# Patient Record
Sex: MALE | Race: WHITE | NOT HISPANIC OR LATINO | Employment: FULL TIME | URBAN - METROPOLITAN AREA
[De-identification: names, ages, dates, MRNs, and addresses within clinical notes are randomized per-mention and may not be internally consistent; named-entity substitution may affect disease eponyms.]

---

## 2020-10-10 ENCOUNTER — APPOINTMENT (OUTPATIENT)
Dept: ULTRASOUND IMAGING | Facility: CLINIC | Age: 35
End: 2020-10-10
Attending: EMERGENCY MEDICINE
Payer: OTHER GOVERNMENT

## 2020-10-10 ENCOUNTER — HOSPITAL ENCOUNTER (EMERGENCY)
Facility: CLINIC | Age: 35
Discharge: HOME | End: 2020-10-11
Attending: EMERGENCY MEDICINE | Admitting: EMERGENCY MEDICINE
Payer: OTHER GOVERNMENT

## 2020-10-10 DIAGNOSIS — R60.9 PITTING EDEMA: ICD-10-CM

## 2020-10-10 DIAGNOSIS — M71.22 POPLITEAL CYST, LEFT: ICD-10-CM

## 2020-10-10 PROBLEM — H93.19 TINNITUS: Status: ACTIVE | Noted: 2020-10-10

## 2020-10-10 PROBLEM — M25.569 KNEE PAIN: Status: ACTIVE | Noted: 2020-10-10

## 2020-10-10 PROBLEM — F17.200 TOBACCO DEPENDENCE: Status: ACTIVE | Noted: 2020-10-10

## 2020-10-10 PROBLEM — M54.50 LOW BACK PAIN: Status: ACTIVE | Noted: 2020-10-10

## 2020-10-10 PROCEDURE — 99284 EMERGENCY DEPT VISIT MOD MDM: CPT | Mod: 25 | Performed by: EMERGENCY MEDICINE

## 2020-10-10 PROCEDURE — 93971 EXTREMITY STUDY: CPT | Mod: LT

## 2020-10-10 PROCEDURE — 99284 EMERGENCY DEPT VISIT MOD MDM: CPT | Performed by: EMERGENCY MEDICINE

## 2020-10-10 ASSESSMENT — ENCOUNTER SYMPTOMS
WEAKNESS: 0
HEADACHES: 0
FATIGUE: 0
BACK PAIN: 0
VOMITING: 0
CHEST TIGHTNESS: 0
NAUSEA: 0
CHILLS: 0
FLANK PAIN: 0
NUMBNESS: 0
ABDOMINAL PAIN: 0
DIARRHEA: 0
COUGH: 0
APPETITE CHANGE: 0
FEVER: 0
LIGHT-HEADEDNESS: 0
WOUND: 0
SHORTNESS OF BREATH: 0

## 2020-10-10 ASSESSMENT — MIFFLIN-ST. JEOR: SCORE: 2253.53

## 2020-10-10 NOTE — ED AVS SNAPSHOT
[facility] Emergency Dept  [address] [address]  Phone: [phone]  Fax: [phone]                                    Tono Hannon   MRN: [id number]    Department: [facility] Emergency Dept   Date of Visit: 10/10/2020           After Visit Summary Signature Page    I have received my discharge instructions, and my questions have been answered. I have discussed any challenges I see with this plan with the nurse or doctor.    ..........................................................................................................................................  Patient/Patient Representative Signature      ..........................................................................................................................................  Patient Representative Print Name and Relationship to Patient    ..................................................               ................................................  Date                                   Time    ..........................................................................................................................................  Reviewed by Signature/Title    ...................................................              ..............................................  Date                                               Time          22EPIC Rev 08/18

## 2020-10-11 VITALS
OXYGEN SATURATION: 98 % | BODY MASS INDEX: 32.27 KG/M2 | WEIGHT: 265 LBS | HEIGHT: 76 IN | HEART RATE: 96 BPM | TEMPERATURE: 98.8 F | SYSTOLIC BLOOD PRESSURE: 137 MMHG | DIASTOLIC BLOOD PRESSURE: 89 MMHG | RESPIRATION RATE: 16 BRPM

## 2020-10-11 NOTE — ED PROVIDER NOTES
History     Chief Complaint   Patient presents with     Leg Pain     Pt c/o left calf pain and swelling - also noted 'bruising' to inner, upper thigh/groin area this evening and doesn't remember any injury - denies any sob or chest pain     HPI  Tono Hannon is a 34 year old male with no significant contributing past medical history presenting for evaluation of left-sided leg pain and swelling.  Patient reports about a week and a half of some achy pain in his left knee.  Patient has a history of knee pain so did not think too much about it but pain has somewhat worsened over the past several days.  Since yesterday patient started develop some mild swelling and stay swelling dramatically worsened.  Patient was on his feet for a prolonged time today.  No pain or swelling in his right leg.  Patient now reporting swelling throughout his leg with some pain in his calf, behind his left knee, and extending into his posterior left leg.  Also noticed a new bruise on the inside of his left leg with no known injury.  Denies pain in the site.  Denies any history of blood clots.  Denies any recent long road trips or flights.  Denies any recent immobility or surgery.  No family history of blood clots.  Denies any chest pain or difficulty breathing.  Otherwise reports systemically feeling well.    Allergies:  No Known Allergies    Problem List:    Patient Active Problem List    Diagnosis Date Noted     Knee pain 10/10/2020     Priority: Medium     Low back pain 10/10/2020     Priority: Medium     Tinnitus 10/10/2020     Priority: Medium     Tobacco dependence 10/10/2020     Priority: Medium     CARDIOVASCULAR SCREENING; LDL GOAL LESS THAN 160 10/31/2010     Priority: Medium        Past Medical History:    Past Medical History:   Diagnosis Date     Chicken Pox      Collar Bone Fracture      Major Depressive Disorder, Single Episode in Full Remission (H)      Penile Papules      Tobacco Abuse        Past Surgical History:    Past  "Surgical History:   Procedure Laterality Date     NO HISTORY OF SURGERY         Family History:    Family History   Problem Relation Age of Onset     Asthma No family hx of      C.A.D. No family hx of      Diabetes No family hx of      Hypertension No family hx of      Cerebrovascular Disease No family hx of      Breast Cancer No family hx of      Cancer - colorectal No family hx of      Prostate Cancer No family hx of        Social History:  Marital Status:   [2]  Social History     Tobacco Use     Smoking status: Not on file     Smokeless tobacco: Current User     Types: Chew   Substance Use Topics     Alcohol use: Yes     Drug use: No        Medications:    No current outpatient medications on file.        Review of Systems   Constitutional: Negative for appetite change, chills, fatigue and fever.   HENT: Negative for congestion.    Respiratory: Negative for cough, chest tightness and shortness of breath.    Cardiovascular: Positive for leg swelling (Left leg only). Negative for chest pain.   Gastrointestinal: Negative for abdominal pain, diarrhea, nausea and vomiting.   Genitourinary: Negative for decreased urine volume and flank pain.   Musculoskeletal: Negative for back pain.   Skin: Negative for rash and wound.   Neurological: Negative for weakness, light-headedness, numbness and headaches.   All other systems reviewed and are negative.      Physical Exam   BP: 137/89  Pulse: 96  Temp: 98.8  F (37.1  C)  Resp: 16  Height: 193 cm (6' 4\")  Weight: 120.2 kg (265 lb)  SpO2: 98 %      Physical Exam  Vitals signs and nursing note reviewed.   Constitutional:       Appearance: He is obese. He is not ill-appearing or diaphoretic.   HENT:      Head: Atraumatic.      Nose: Nose normal.   Eyes:      Conjunctiva/sclera: Conjunctivae normal.   Cardiovascular:      Rate and Rhythm: Normal rate and regular rhythm.      Pulses: Normal pulses.   Pulmonary:      Effort: Pulmonary effort is normal.   Musculoskeletal:      " Right lower leg: No edema.      Left lower leg: He exhibits tenderness (+calf tenderness). He exhibits no deformity. Edema (1-2+ Pitting edema) present.   Skin:     General: Skin is warm.      Capillary Refill: Capillary refill takes less than 2 seconds.   Neurological:      Mental Status: He is alert and oriented to person, place, and time.   Psychiatric:         Mood and Affect: Mood normal.         ED Course        Procedures                   Results for orders placed or performed during the hospital encounter of 10/10/20 (from the past 24 hour(s))   US Lower Extremity Venous Duplex Left    Narrative    EXAM: US LOWER EXTREMITY VENOUS DUPLEX LEFT  LOCATION: [facility]  DATE/TIME: 10/10/2020 11:05 PM    INDICATION: Left leg pain and swelling  COMPARISON: None.  TECHNIQUE: Venous Duplex ultrasound of the left lower extremity with and without compression, augmentation and duplex. Color flow and spectral Doppler with waveform analysis performed.    FINDINGS: Exam includes the common femoral, femoral, popliteal, and contralateral common femoral veins as well as segmentally visualized deep calf veins and greater saphenous vein.     LEFT: No deep vein thrombosis. No superficial thrombophlebitis. Large 12.7 x 1.0 x 2.6 cm popliteal cyst.      Impression    IMPRESSION:  1.  No deep venous thrombosis in the left lower extremity.       Medications - No data to display     12:12 AM Patient re-assessed: Advised of ultrasound findings.  No evidence of DVT but a large popliteal cyst is present.  Recommended compression socks for his leg edema.  Ace wrapped here.  Given the progressive pain and swelling, recommend orthopedic follow-up for possible intervention to his large popliteal cyst.  Advised to keep his legs up when possible and wear compression socks during the day when he is ambulatory.      Assessments & Plan (with Medical Decision Making)  32-year-old male presenting for evaluation of progressive pain  and swelling of his left lower leg.  Pain began around the back of his knee and has progressed to involve his posterior knee as well as lower leg with associated swelling of the lower leg.  On exam he has some pitting edema and notable swelling of the left lower leg.  No right leg symptoms.  No history of DVT.  Ultrasound negative for DVT.  Ultrasound did show a large popliteal cyst which is likely causing some compression leading to the associated edema.  Recommended compression treatment and follow-up with orthopedics given the presence of large possibly enlarging cyst which may need surgical intervention.     I have reviewed the nursing notes.    I have reviewed the findings, diagnosis, plan and need for follow up with the patient.       There are no discharge medications for this patient.      Final diagnoses:   Popliteal cyst, left   Pitting edema       10/10/2020   [facility] EMERGENCY DEPT     Sarabia, Neptali Banegas MD  10/11/20 0037

## 2022-10-06 ENCOUNTER — HOSPITAL ENCOUNTER (EMERGENCY)
Facility: CLINIC | Age: 37
Discharge: HOME | End: 2022-10-06
Attending: NURSE PRACTITIONER | Admitting: NURSE PRACTITIONER
Payer: OTHER GOVERNMENT

## 2022-10-06 VITALS
SYSTOLIC BLOOD PRESSURE: 124 MMHG | RESPIRATION RATE: 22 BRPM | WEIGHT: 275 LBS | BODY MASS INDEX: 33.49 KG/M2 | DIASTOLIC BLOOD PRESSURE: 59 MMHG | OXYGEN SATURATION: 95 % | HEIGHT: 76 IN | HEART RATE: 90 BPM | TEMPERATURE: 98.1 F

## 2022-10-06 DIAGNOSIS — R05.8 POST-VIRAL COUGH SYNDROME: ICD-10-CM

## 2022-10-06 PROCEDURE — 99213 OFFICE O/P EST LOW 20 MIN: CPT | Performed by: NURSE PRACTITIONER

## 2022-10-06 PROCEDURE — G0463 HOSPITAL OUTPT CLINIC VISIT: HCPCS | Performed by: NURSE PRACTITIONER

## 2022-10-06 RX ORDER — DEXAMETHASONE 2 MG/1
10 TABLET ORAL ONCE
Qty: 5 TABLET | Refills: 0 | Status: SHIPPED | OUTPATIENT
Start: 2022-10-06 | End: 2022-10-06

## 2022-10-06 ASSESSMENT — ACTIVITIES OF DAILY LIVING (ADL): ADLS_ACUITY_SCORE: 35

## 2022-10-06 NOTE — ED PROVIDER NOTES
History     Chief Complaint   Patient presents with     Cough     HPI  Tono Hannon is a 36 year old male who presents with concerns of 6 day hx of Ear pressure, Cough, Nasal congestion, Decreased energy, weakness improving, Post tussive emesis times two yesterday,    Patient has been using over-the-counter NyQuil and states this is mildly helpful    5 year old Son was ill and better now    Pt has tested for covid at home negative, covid vaccinated    Pt denies recreational drug use, non smoker, non vaping, non chewing tobacco, no hx of asthma,    Allergies:  No Known Allergies    Problem List:    Patient Active Problem List    Diagnosis Date Noted     Knee pain 10/10/2020     Priority: Medium     Low back pain 10/10/2020     Priority: Medium     Tinnitus 10/10/2020     Priority: Medium     Tobacco dependence 10/10/2020     Priority: Medium     CARDIOVASCULAR SCREENING; LDL GOAL LESS THAN 160 10/31/2010     Priority: Medium        Past Medical History:    Past Medical History:   Diagnosis Date     Chicken Pox      Collar Bone Fracture      Major Depressive Disorder, Single Episode in Full Remission (H)      Penile Papules      Tobacco Abuse        Past Surgical History:    Past Surgical History:   Procedure Laterality Date     NO HISTORY OF SURGERY         Family History:    Family History   Problem Relation Age of Onset     Asthma No family hx of      C.A.D. No family hx of      Diabetes No family hx of      Hypertension No family hx of      Cerebrovascular Disease No family hx of      Breast Cancer No family hx of      Cancer - colorectal No family hx of      Prostate Cancer No family hx of        Social History:  Marital Status:   [2]  Social History     Tobacco Use     Smokeless tobacco: Current User     Types: Chew   Substance Use Topics     Alcohol use: Yes     Drug use: No        Medications:    dexamethasone (DECADRON) 2 MG tablet      Review of Systems  As mentioned above in the history present  "illness. All other systems were reviewed and are negative.    Physical Exam   BP: 124/59  Pulse: 90  Temp: 98.1  F (36.7  C)  Resp: 22  Height: 193 cm (6' 4\")  Weight: 124.7 kg (275 lb)  SpO2: 95 %      Physical Exam  Vitals and nursing note reviewed.   Constitutional:       General: He is in acute distress.      Appearance: Normal appearance. He is well-developed. He is not ill-appearing, toxic-appearing or diaphoretic.   HENT:      Head: Normocephalic and atraumatic. No contusion.      Jaw: No trismus.      Right Ear: Hearing, tympanic membrane, ear canal and external ear normal. No drainage or tenderness. Tympanic membrane is not erythematous or bulging.      Left Ear: Hearing, tympanic membrane, ear canal and external ear normal. No drainage or tenderness. Tympanic membrane is not erythematous or bulging.      Nose: Congestion and rhinorrhea present. Rhinorrhea is clear.      Right Turbinates: Enlarged.      Left Turbinates: Enlarged.      Right Sinus: No maxillary sinus tenderness or frontal sinus tenderness.      Left Sinus: No maxillary sinus tenderness or frontal sinus tenderness.      Mouth/Throat:      Pharynx: Uvula midline. No oropharyngeal exudate or uvula swelling.      Tonsils: No tonsillar exudate. 0 on the right. 0 on the left.   Neck:      Trachea: No tracheal deviation.   Cardiovascular:      Rate and Rhythm: Normal rate and regular rhythm.      Heart sounds: Normal heart sounds. No murmur heard.    No friction rub. No gallop.   Pulmonary:      Effort: Pulmonary effort is normal. No respiratory distress.      Breath sounds: No stridor. No wheezing or rales.   Lymphadenopathy:      Cervical: No cervical adenopathy.   Skin:     General: Skin is warm.      Capillary Refill: Capillary refill takes less than 2 seconds.      Findings: No rash.   Neurological:      Mental Status: He is alert and oriented to person, place, and time.   Psychiatric:         Behavior: Behavior is cooperative.         ED " Course                 Procedures    No results found for this or any previous visit (from the past 24 hour(s)).    Medications - No data to display    Assessments & Plan (with Medical Decision Making)     I have reviewed the nursing notes.    I have reviewed the findings, diagnosis, plan and need for follow up with the patient.  -46-year-old male presents emergency department with a 6-day history of cough, congestion that is persistent after initial viral syndrome illness lasting for approximately 3 days and now persistent symptoms.  On exam patient has a harsh nonproductive cough, nasal congestion, nasal turbinate hypertrophy with clear rhinorrhea, no findings of mastoiditis, otitis media, otitis externa, tonsillitis, peritonsillar abscess, and normal lung sounds and respirations.  We will keep a 1 dose episode of 10 mg of dexamethasone.  Discussed other supportive measures such as Zyrtec, Delsym, honey and encourage follow-up if no improvement also Flonase.  Patient verbalized understanding and discharged in stable condition    Discharge Medication List as of 10/6/2022  1:22 PM      START taking these medications    Details   dexamethasone (DECADRON) 2 MG tablet Take 5 tablets (10 mg) by mouth once for 1 dose, Disp-5 tablet, R-0, E-Prescribe             Final diagnoses:   Post-viral cough syndrome       10/6/2022   [facility] EMERGENCY DEPT     Faby Lai APRN CNP  10/06/22 4058

## 2022-10-06 NOTE — Clinical Note
Tono Hannon was seen and treated in our emergency department on 10/6/2022.  He may return to work on 10/10/2022.  Pt seen in urgent care and treated.  Pt should be able to return to work without restrictions     If you have any questions or concerns, please don't hesitate to call.      Faby Lai, RIKY CNP

## 2023-03-15 ENCOUNTER — HOSPITAL ENCOUNTER (EMERGENCY)
Facility: CLINIC | Age: 38
Discharge: HOME | End: 2023-03-15
Attending: EMERGENCY MEDICINE | Admitting: EMERGENCY MEDICINE
Payer: OTHER GOVERNMENT

## 2023-03-15 VITALS
RESPIRATION RATE: 18 BRPM | DIASTOLIC BLOOD PRESSURE: 90 MMHG | SYSTOLIC BLOOD PRESSURE: 129 MMHG | HEART RATE: 79 BPM | TEMPERATURE: 98.8 F | WEIGHT: 279 LBS | HEIGHT: 74 IN | OXYGEN SATURATION: 93 % | BODY MASS INDEX: 35.81 KG/M2

## 2023-03-15 DIAGNOSIS — R10.84 ABDOMINAL PAIN, GENERALIZED: ICD-10-CM

## 2023-03-15 DIAGNOSIS — R11.2 NAUSEA AND VOMITING, UNSPECIFIED VOMITING TYPE: ICD-10-CM

## 2023-03-15 PROBLEM — F43.21 ADJUSTMENT DISORDER WITH DEPRESSED MOOD: Status: ACTIVE | Noted: 2023-03-15

## 2023-03-15 PROBLEM — G47.33 OBSTRUCTIVE SLEEP APNEA (ADULT) (PEDIATRIC): Status: ACTIVE | Noted: 2023-03-15

## 2023-03-15 LAB
ALBUMIN SERPL BCG-MCNC: 4.7 G/DL (ref 3.5–5.2)
ALP SERPL-CCNC: 91 U/L (ref 40–129)
ALT SERPL W P-5'-P-CCNC: 43 U/L (ref 10–50)
ANION GAP SERPL CALCULATED.3IONS-SCNC: 11 MMOL/L (ref 7–15)
AST SERPL W P-5'-P-CCNC: 30 U/L (ref 10–50)
BASOPHILS # BLD AUTO: 0.1 10E3/UL (ref 0–0.2)
BASOPHILS NFR BLD AUTO: 1 %
BILIRUB DIRECT SERPL-MCNC: <0.2 MG/DL (ref 0–0.3)
BILIRUB SERPL-MCNC: 0.7 MG/DL
BUN SERPL-MCNC: 13.1 MG/DL (ref 6–20)
CALCIUM SERPL-MCNC: 9.9 MG/DL (ref 8.6–10)
CHLORIDE SERPL-SCNC: 100 MMOL/L (ref 98–107)
CREAT SERPL-MCNC: 0.97 MG/DL (ref 0.67–1.17)
DEPRECATED HCO3 PLAS-SCNC: 25 MMOL/L (ref 22–29)
EOSINOPHIL # BLD AUTO: 0.1 10E3/UL (ref 0–0.7)
EOSINOPHIL NFR BLD AUTO: 1 %
ERYTHROCYTE [DISTWIDTH] IN BLOOD BY AUTOMATED COUNT: 12.1 % (ref 10–15)
GFR SERPL CREATININE-BSD FRML MDRD: >90 ML/MIN/1.73M2
GLUCOSE SERPL-MCNC: 104 MG/DL (ref 70–99)
HCT VFR BLD AUTO: 44.5 % (ref 40–53)
HGB BLD-MCNC: 15.6 G/DL (ref 13.3–17.7)
HOLD SPECIMEN: NORMAL
HOLD SPECIMEN: NORMAL
IMM GRANULOCYTES # BLD: 0 10E3/UL
IMM GRANULOCYTES NFR BLD: 0 %
LIPASE SERPL-CCNC: 17 U/L (ref 13–60)
LYMPHOCYTES # BLD AUTO: 2 10E3/UL (ref 0.8–5.3)
LYMPHOCYTES NFR BLD AUTO: 27 %
MCH RBC QN AUTO: 32.4 PG (ref 26.5–33)
MCHC RBC AUTO-ENTMCNC: 35.1 G/DL (ref 31.5–36.5)
MCV RBC AUTO: 92 FL (ref 78–100)
MONOCYTES # BLD AUTO: 0.9 10E3/UL (ref 0–1.3)
MONOCYTES NFR BLD AUTO: 13 %
NEUTROPHILS # BLD AUTO: 4.2 10E3/UL (ref 1.6–8.3)
NEUTROPHILS NFR BLD AUTO: 58 %
NRBC # BLD AUTO: 0 10E3/UL
NRBC BLD AUTO-RTO: 0 /100
PLATELET # BLD AUTO: 228 10E3/UL (ref 150–450)
POTASSIUM SERPL-SCNC: 4.2 MMOL/L (ref 3.4–5.3)
PROT SERPL-MCNC: 8.3 G/DL (ref 6.4–8.3)
RBC # BLD AUTO: 4.82 10E6/UL (ref 4.4–5.9)
SODIUM SERPL-SCNC: 136 MMOL/L (ref 136–145)
WBC # BLD AUTO: 7.2 10E3/UL (ref 4–11)

## 2023-03-15 PROCEDURE — 36415 COLL VENOUS BLD VENIPUNCTURE: CPT | Performed by: EMERGENCY MEDICINE

## 2023-03-15 PROCEDURE — 80053 COMPREHEN METABOLIC PANEL: CPT | Performed by: EMERGENCY MEDICINE

## 2023-03-15 PROCEDURE — 85025 COMPLETE CBC W/AUTO DIFF WBC: CPT | Performed by: EMERGENCY MEDICINE

## 2023-03-15 PROCEDURE — 99284 EMERGENCY DEPT VISIT MOD MDM: CPT | Mod: 25 | Performed by: EMERGENCY MEDICINE

## 2023-03-15 PROCEDURE — 99284 EMERGENCY DEPT VISIT MOD MDM: CPT | Performed by: EMERGENCY MEDICINE

## 2023-03-15 PROCEDURE — 82248 BILIRUBIN DIRECT: CPT | Performed by: EMERGENCY MEDICINE

## 2023-03-15 PROCEDURE — 96374 THER/PROPH/DIAG INJ IV PUSH: CPT | Performed by: EMERGENCY MEDICINE

## 2023-03-15 PROCEDURE — 250N000011 HC RX IP 250 OP 636: Performed by: EMERGENCY MEDICINE

## 2023-03-15 PROCEDURE — 83690 ASSAY OF LIPASE: CPT | Performed by: EMERGENCY MEDICINE

## 2023-03-15 RX ORDER — KETOROLAC TROMETHAMINE 15 MG/ML
15 INJECTION, SOLUTION INTRAMUSCULAR; INTRAVENOUS ONCE
Status: COMPLETED | OUTPATIENT
Start: 2023-03-15 | End: 2023-03-15

## 2023-03-15 RX ORDER — ONDANSETRON 4 MG/1
4 TABLET, ORALLY DISINTEGRATING ORAL EVERY 8 HOURS PRN
Qty: 12 TABLET | Refills: 0 | Status: SHIPPED | OUTPATIENT
Start: 2023-03-15

## 2023-03-15 RX ADMIN — KETOROLAC TROMETHAMINE 15 MG: 15 INJECTION, SOLUTION INTRAMUSCULAR; INTRAVENOUS at 17:59

## 2023-03-15 ASSESSMENT — ACTIVITIES OF DAILY LIVING (ADL)
ADLS_ACUITY_SCORE: 35
ADLS_ACUITY_SCORE: 35

## 2023-03-15 NOTE — LETTER
March 15, 2023      To Whom It May Concern:      Tono Hannon was seen in our Emergency Department today, 03/15/23. May return to work on 3/17/2023.     Sincerely,        Alex May MD

## 2023-03-15 NOTE — ED PROVIDER NOTES
"/72   Pulse 100   Temp 98.8  F (37.1  C)   Resp 18   Ht 1.88 m (6' 2\")   Wt 126.6 kg (279 lb)   SpO2 93%   BMI 35.82 kg/m      Tono Hannon is a 37-year-old male presenting with complaints of abdominal pain for the past 3 days.  Patient also complains of associated nausea and vomiting.  His abdominal pain is located in the upper quadrants and worse after eating.  Denies history of any abdominal surgeries in the past.  Given patient's history, I recommended he be evaluated in the emergency department.  We discussed that he will likely require further testing and/or treatment beyond the capabilities of the current urgent care setting including labs and potential imaging.  Patient expresses understanding of this and agreement with the plan.     Beverley Corral PA-C  03/15/23 4438    "

## 2023-03-15 NOTE — ED PROVIDER NOTES
History     Chief Complaint   Patient presents with     Abdominal Pain     HPI  Tono Hannon is a 37 year old male with history of former tobacco use, obesity, with 4 days of intermittent abdominal pain.  Reports sharp pains all over his belly that come and go.  Seem to be more bothersome when he is up and moving around.  Improved when he lies down and rests.  Pain is more consistent in his upper abdomen and has been most predominant in his left upper quadrant today.  He has had 4 episodes of vomiting over the course of the past 4 days.  Has decreased appetite and symptoms slightly worse after eating.  Denies any fever, sore throat, nasal congestion, melena or bloody stools.  Has not taken anything for pain control for the past 4 days.      The patient's PMHx, Surgical Hx, Allergies, and Medications were all reviewed with the patient.    Allergies:  No Known Allergies    Problem List:    Patient Active Problem List    Diagnosis Date Noted     Adjustment disorder with depressed mood 03/15/2023     Priority: Medium     Obstructive sleep apnea (adult) (pediatric) 03/15/2023     Priority: Medium     Knee pain 10/10/2020     Priority: Medium     Low back pain 10/10/2020     Priority: Medium     Tinnitus 10/10/2020     Priority: Medium     Tobacco dependence 10/10/2020     Priority: Medium     CARDIOVASCULAR SCREENING; LDL GOAL LESS THAN 160 10/31/2010     Priority: Medium        Past Medical History:    Past Medical History:   Diagnosis Date     Chicken Pox      Collar Bone Fracture      Major Depressive Disorder, Single Episode in Full Remission (H)      Penile Papules      Tobacco Abuse        Past Surgical History:    Past Surgical History:   Procedure Laterality Date     NO HISTORY OF SURGERY         Family History:    Family History   Problem Relation Age of Onset     Asthma No family hx of      C.A.D. No family hx of      Diabetes No family hx of      Hypertension No family hx of      Cerebrovascular Disease No  "family hx of      Breast Cancer No family hx of      Cancer - colorectal No family hx of      Prostate Cancer No family hx of        Social History:  Marital Status:   [2]  Social History     Tobacco Use     Smokeless tobacco: Current     Types: Chew   Substance Use Topics     Alcohol use: Yes     Drug use: No        Medications:    ondansetron (ZOFRAN ODT) 4 MG ODT tab  dexamethasone (DECADRON) 2 MG tablet          Review of Systems  Pertinent positives and negatives mentioned in HPI    Physical Exam   BP: 131/72  Pulse: 100  Temp: 98.8  F (37.1  C)  Resp: 18  Height: 188 cm (6' 2\")  Weight: 126.6 kg (279 lb)  SpO2: 93 %    Physical Exam  GEN: Awake, alert, and cooperative. No acute distress.  Resting comfortably on cart  HENT: MMM. External ears and nose normal bilaterally.  EYES: EOM intact. Conjunctiva clear. No discharge.   NECK: Symmetric, freely mobile.   CV : Extremities warm and well perfused  PULM: Normal effort.  Speaking in full sentences with no audible wheezing  ABD: Soft, non-tender, non-distended. No rebound or guarding.   NEURO: Normal speech. Following commands. CN II-XII grossly intact. Answering questions and interacting appropriately.   INT: Warm. No diaphoresis. Normal color.        ED Course        Procedures           Critical Care time:  none               Results for orders placed or performed during the hospital encounter of 03/15/23 (from the past 24 hour(s))   CBC with Platelets & Differential    Narrative    The following orders were created for panel order CBC with Platelets & Differential.  Procedure                               Abnormality         Status                     ---------                               -----------         ------                     CBC with platelets and d...[[id number]]                      Final result                 Please view results for these tests on the individual orders.   Basic metabolic panel   Result Value Ref Range    Sodium 136 136 - " 145 mmol/L    Potassium 4.2 3.4 - 5.3 mmol/L    Chloride 100 98 - 107 mmol/L    Carbon Dioxide (CO2) 25 22 - 29 mmol/L    Anion Gap 11 7 - 15 mmol/L    Urea Nitrogen 13.1 6.0 - 20.0 mg/dL    Creatinine 0.97 0.67 - 1.17 mg/dL    Calcium 9.9 8.6 - 10.0 mg/dL    Glucose 104 (H) 70 - 99 mg/dL    GFR Estimate >90 >60 mL/min/1.73m2   Lipase   Result Value Ref Range    Lipase 17 13 - 60 U/L   Reagan Draw    Narrative    The following orders were created for panel order Reagan Draw.  Procedure                               Abnormality         Status                     ---------                               -----------         ------                     Extra Blue Top Tube[[id number]]                              Final result               Extra Red Top Tube[[id number]]                               Final result                 Please view results for these tests on the individual orders.   CBC with platelets and differential   Result Value Ref Range    WBC Count 7.2 4.0 - 11.0 10e3/uL    RBC Count 4.82 4.40 - 5.90 10e6/uL    Hemoglobin 15.6 13.3 - 17.7 g/dL    Hematocrit 44.5 40.0 - 53.0 %    MCV 92 78 - 100 fL    MCH 32.4 26.5 - 33.0 pg    MCHC 35.1 31.5 - 36.5 g/dL    RDW 12.1 10.0 - 15.0 %    Platelet Count 228 150 - 450 10e3/uL    % Neutrophils 58 %    % Lymphocytes 27 %    % Monocytes 13 %    % Eosinophils 1 %    % Basophils 1 %    % Immature Granulocytes 0 %    NRBCs per 100 WBC 0 <1 /100    Absolute Neutrophils 4.2 1.6 - 8.3 10e3/uL    Absolute Lymphocytes 2.0 0.8 - 5.3 10e3/uL    Absolute Monocytes 0.9 0.0 - 1.3 10e3/uL    Absolute Eosinophils 0.1 0.0 - 0.7 10e3/uL    Absolute Basophils 0.1 0.0 - 0.2 10e3/uL    Absolute Immature Granulocytes 0.0 <=0.4 10e3/uL    Absolute NRBCs 0.0 10e3/uL   Extra Blue Top Tube   Result Value Ref Range    Hold Specimen JIC    Extra Red Top Tube   Result Value Ref Range    Hold Specimen JI    Hepatic panel   Result Value Ref Range    Protein Total 8.3 6.4 - 8.3 g/dL    Albumin 4.7  3.5 - 5.2 g/dL    Bilirubin Total 0.7 <=1.2 mg/dL    Alkaline Phosphatase 91 40 - 129 U/L    AST 30 10 - 50 U/L    ALT 43 10 - 50 U/L    Bilirubin Direct <0.20 0.00 - 0.30 mg/dL       Medications   ketorolac (TORADOL) injection 15 mg (15 mg Intravenous $Given 3/15/23 8156)       Assessments & Plan (with Medical Decision Making)   37 year old male with past medical history of obstructive sleep apnea, obesity, anxiety, with 4 days of intermittent generalized abdominal discomfort associated with nausea and vomiting.  On examination he does not appear distressed and exam is overall reassuring.  No localized tenderness.  CBC was normal.  BMP grossly normal, glucose 104.  Hepatic panel normal.  Lipase normal.  Was given 50 mg IV ketorolac in the emergency department with improvement but not complete resolution of his pain.  My suspicion for a serious etiology of his abdominal pain is low and no immediate concern for acute biliary pathology, pancreatitis, appendicitis, bowel obstruction.  I do not think that he needs CT imaging at this time.  We did discuss the risks and benefits of proceeding with CT imaging and he is okay with foregoing at this time.  We did discuss that if his symptoms worsen or new or concerning symptoms develop he should be reevaluated and that may change need for imaging.  Recommend ibuprofen/Aleve for pain control.  Work note given.  Prescription for Zofran given.    I have reviewed the nursing notes.         Discharge Medication List as of 3/15/2023  7:07 PM      START taking these medications    Details   ondansetron (ZOFRAN ODT) 4 MG ODT tab Take 1 tablet (4 mg) by mouth every 8 hours as needed for nausea or vomiting, Disp-12 tablet, R-0, E-Prescribe             Final diagnoses:   Abdominal pain, generalized   Nausea and vomiting, unspecified vomiting type     Alex May MD    3/15/2023   [facility] EMERGENCY DEPT    Disclaimer: This note consists of words and symbols  derived from keyboarding and dictation using voice recognition software.  As a result, there may be errors that have gone undetected.  Please consider this when interpreting information found in this note.             Alex May MD  03/15/23 1922

## 2023-03-15 NOTE — ED NOTES
C/O upper abdomen pain, worse after eating, better when lies down, vomiting mostly in the AM that is yellow gastric emesis, not eating much due to pain, states has also had a recent cough with some chest congestion

## 2023-03-15 NOTE — ED TRIAGE NOTES
intermittent generalized abd pain since Sunday worse after eating    intermittent nausea and vomiting    Denies loose stools       Triage Assessment     Row Name 03/15/23 1600       Triage Assessment (Adult)    Airway WDL WDL       Respiratory WDL    Respiratory WDL WDL       Skin Circulation/Temperature WDL    Skin Circulation/Temperature WDL WDL       Cardiac WDL    Cardiac WDL WDL       Peripheral/Neurovascular WDL    Peripheral Neurovascular WDL WDL       Cognitive/Neuro/Behavioral WDL    Cognitive/Neuro/Behavioral WDL WDL

## 2023-03-16 NOTE — DISCHARGE INSTRUCTIONS
Your evaluation in the Emergency Department was reassuring. I suspect that your symptoms will improve in the next few days. You may take aleve or ibuprofen for pain and ondansetron for nausea.     Return to the emergency department if you develop worsening abdominal pain, severe nausea and vomiting to the point where you are unable to keep down fluids, if you develop chest pain or difficulty breathing, blood in your stool, dizziness or fainting, or if you develop any other new or concerning symptoms as these could be signs of more serious medical illness. Try to stay well hydrated.

## 2023-05-22 ENCOUNTER — HOSPITAL ENCOUNTER (EMERGENCY)
Facility: CLINIC | Age: 38
Discharge: HOME | End: 2023-05-22
Attending: PHYSICIAN ASSISTANT | Admitting: PHYSICIAN ASSISTANT
Payer: OTHER GOVERNMENT

## 2023-05-22 VITALS
OXYGEN SATURATION: 98 % | HEART RATE: 78 BPM | DIASTOLIC BLOOD PRESSURE: 95 MMHG | TEMPERATURE: 98.5 F | RESPIRATION RATE: 16 BRPM | SYSTOLIC BLOOD PRESSURE: 130 MMHG

## 2023-05-22 DIAGNOSIS — J02.9 ACUTE PHARYNGITIS, UNSPECIFIED ETIOLOGY: ICD-10-CM

## 2023-05-22 DIAGNOSIS — J06.9 VIRAL URI: ICD-10-CM

## 2023-05-22 LAB
FLUAV RNA SPEC QL NAA+PROBE: NEGATIVE
FLUBV RNA RESP QL NAA+PROBE: NEGATIVE
GROUP A STREP BY PCR: NOT DETECTED
RSV RNA SPEC NAA+PROBE: NEGATIVE
SARS-COV-2 RNA RESP QL NAA+PROBE: NEGATIVE

## 2023-05-22 PROCEDURE — 87637 SARSCOV2&INF A&B&RSV AMP PRB: CPT | Performed by: PHYSICIAN ASSISTANT

## 2023-05-22 PROCEDURE — 87651 STREP A DNA AMP PROBE: CPT | Performed by: PHYSICIAN ASSISTANT

## 2023-05-22 PROCEDURE — G0463 HOSPITAL OUTPT CLINIC VISIT: HCPCS | Performed by: PHYSICIAN ASSISTANT

## 2023-05-22 PROCEDURE — 99213 OFFICE O/P EST LOW 20 MIN: CPT | Performed by: PHYSICIAN ASSISTANT

## 2023-05-22 PROCEDURE — C9803 HOPD COVID-19 SPEC COLLECT: HCPCS | Performed by: PHYSICIAN ASSISTANT

## 2023-05-22 ASSESSMENT — ACTIVITIES OF DAILY LIVING (ADL): ADLS_ACUITY_SCORE: 35

## 2023-05-22 NOTE — ED PROVIDER NOTES
History     Chief Complaint   Patient presents with     Pharyngitis     HPI  Tono Hannon is a 37 year old male who presents to urgent care with concern for possible strep throat.  Patient reports that he has 2 children tested positive within the last week.  Approximately 6 days ago he developed subjective fever, chills, myalgias, sore throat.  Over the last 2 to 3 days he has had increasing congestion, cough, nausea, headache.  He denies any actual dyspnea, wheezing, vomiting, diarrhea or abdominal pain.  He has attempted to treat with Excedrin and Dayquil without relief.      Allergies:  No Known Allergies    Problem List:    Patient Active Problem List    Diagnosis Date Noted     Adjustment disorder with depressed mood 03/15/2023     Priority: Medium     Obstructive sleep apnea (adult) (pediatric) 03/15/2023     Priority: Medium     Knee pain 10/10/2020     Priority: Medium     Low back pain 10/10/2020     Priority: Medium     Tinnitus 10/10/2020     Priority: Medium     Tobacco dependence 10/10/2020     Priority: Medium     CARDIOVASCULAR SCREENING; LDL GOAL LESS THAN 160 10/31/2010     Priority: Medium        Past Medical History:    Past Medical History:   Diagnosis Date     Chicken Pox      Collar Bone Fracture      Major Depressive Disorder, Single Episode in Full Remission (H)      Penile Papules      Tobacco Abuse        Past Surgical History:    Past Surgical History:   Procedure Laterality Date     NO HISTORY OF SURGERY         Family History:    Family History   Problem Relation Age of Onset     Asthma No family hx of      C.A.D. No family hx of      Diabetes No family hx of      Hypertension No family hx of      Cerebrovascular Disease No family hx of      Breast Cancer No family hx of      Cancer - colorectal No family hx of      Prostate Cancer No family hx of        Social History:  Marital Status:   [2]  Social History     Tobacco Use     Smokeless tobacco: Current     Types: Chew    Substance Use Topics     Alcohol use: Yes     Drug use: No      Medications:    dexamethasone (DECADRON) 2 MG tablet  ondansetron (ZOFRAN ODT) 4 MG ODT tab      Review of Systems  CONSTITUTIONAL:POSITIVE  For subjective fever, chills, myalgias   INTEGUMENTARY/SKIN: NEGATIVE for worrisome rashes, moles or lesions  EYES: NEGATIVE for vision changes or irritation  ENT/MOUTH: POSITIVE for sore throat, nasal congestion and NEGATIVE for ear pain   RESP:POSITIVE for cough NEGATIVE for SOB/dyspnea, wheezing   GI: NEGATIVE for abdominal pain, diarrhea, nausea and vomiting  Physical Exam   BP: (!) 130/95  Pulse: 78  Temp: 98.5  F (36.9  C)  Resp: 16  SpO2: 98 %  Physical Exam  GENERAL APPEARANCE: healthy, alert and no distress  EYES: EOMI,  PERRL, conjunctiva clear  HENT: ear canals and TM's normal.  Nose and mouth without ulcers, erythema or lesions  NECK: supple, nontender, no lymphadenopathy  RESP: lungs clear to auscultation - no rales, rhonchi or wheezes  CV: regular rates and rhythm, normal S1 S2, no murmur noted  SKIN: no suspicious lesions or rashes  ED Course           Procedures       Critical Care time:  none        Results for orders placed or performed during the hospital encounter of 05/22/23   Symptomatic Influenza A/B, RSV, & SARS-CoV2 PCR (COVID-19) Nasopharyngeal     Status: Normal    Specimen: Nasopharyngeal; Swab   Result Value Ref Range    Influenza A PCR Negative Negative    Influenza B PCR Negative Negative    RSV PCR Negative Negative    SARS CoV2 PCR Negative Negative    Narrative    Testing was performed using the Xpert Xpress CoV2/Flu/RSV Assay on the [employer] GeneXpert Instrument. This test should be ordered for the detection of SARS-CoV-2, influenza, and RSV viruses in individuals who meet clinical and/or epidemiological criteria. Test performance is unknown in asymptomatic patients. This test is for in vitro diagnostic use under the FDA EUA for laboratories certified under CLIA to perform high  or moderate complexity testing. This test has not been FDA cleared or approved. A negative result does not rule out the presence of PCR inhibitors in the specimen or target RNA in concentration below the limit of detection for the assay. If only one viral target is positive but coinfection with multiple targets is suspected, the sample should be re-tested with another FDA cleared, approved, or authorized test, if coinfection would change clinical management. This test was validated by the [facility] [email or web address]. These laboratories are certified under the Clinical Laboratory Improvement Amendments of 1988 (CLIA-88) as qualified to perform high complexity laboratory testing.   Group A Streptococcus PCR Throat Swab     Status: Normal    Specimen: Throat; Swab   Result Value Ref Range    Group A strep by PCR Not Detected Not Detected    Narrative    The Xpert Xpress Strep A test, performed on the [employer], is a rapid, qualitative in vitro diagnostic test for the detection of Streptococcus pyogenes (Group A ß-hemolytic Streptococcus, Strep A) in throat swab specimens from patients with signs and symptoms of pharyngitis. The Xpert Xpress Strep A test can be used as an aid in the diagnosis of Group A Streptococcal pharyngitis. The assay is not intended to monitor treatment for Group A Streptococcus infections. The Xpert Xpress Strep A test utilizes an automated real-time polymerase chain reaction (PCR) to detect Streptococcus pyogenes DNA.     Medications - No data to display    Assessments & Plan (with Medical Decision Making)     I have reviewed the nursing notes.  I have reviewed the findings, diagnosis, plan and need for follow up with the patient.     Discharge Medication List as of 5/22/2023  3:16 PM        Final diagnoses:   Viral URI   Acute pharyngitis, unspecified etiology     37-year-old male presents to urgent care with concern over possible strep throat given 6-day history of  subjective fever, chills no myalgias, throat pain, nasal congestion, cough, headache.  He had elevated blood pressure upon arrival, remainder of vital signs were stable.  He had negative strep PCR testing.  Negative influenza, COVID-19, RSV testing.  Symptoms most consistent with viral URI.  He was discharged home stable with instructions for continued symptomatic treatment. Follow up with PCP if no improvement in 5-7 days. Worrisome reason to return to ER/UC sooner discussed.     Disclaimer: This note consists of symbols derived from keyboarding, dictation, and/or voice recognition software. As a result, there may be errors in the script that have gone undetected.  Please consider this when interpreting information found in the chart.      5/22/2023   [facility] EMERGENCY DEPT     Yoly Henry PA-C  05/27/23 [id number]